# Patient Record
Sex: MALE | URBAN - METROPOLITAN AREA
[De-identification: names, ages, dates, MRNs, and addresses within clinical notes are randomized per-mention and may not be internally consistent; named-entity substitution may affect disease eponyms.]

---

## 2020-09-02 ENCOUNTER — IMPORTED ENCOUNTER (OUTPATIENT)
Dept: URBAN - METROPOLITAN AREA CLINIC 9 | Facility: CLINIC | Age: 70
End: 2020-09-02

## 2020-10-05 ENCOUNTER — IMPORTED ENCOUNTER (OUTPATIENT)
Dept: URBAN - METROPOLITAN AREA CLINIC 9 | Facility: CLINIC | Age: 70
End: 2020-10-05

## 2020-11-04 ENCOUNTER — IMPORTED ENCOUNTER (OUTPATIENT)
Dept: URBAN - METROPOLITAN AREA CLINIC 9 | Facility: CLINIC | Age: 70
End: 2020-11-04

## 2021-03-30 NOTE — PATIENT DISCUSSION
BLEPHARITIS, OU: PRESCRIBE WARM COMPRESSES AND EYELID SCRUBS QD-BID, ARTIFICIAL TEARS BID-QID, AND ERYTHROMYCIN OPHTHALMIC OINTMENT EVERY NIGHT AS NEEDED. RETURN FOR FOLLOW-UP AS SCHEDULED. Products Recommended: Any brand with an SPF of 70 or higher is OK.  [employer] and [employer] (blue butterfly) make oil free sunscreens that are suitable for use as daily moisturizers. General Sunscreen Counseling: I recommended a broad spectrum sunscreen with a SPF of 70 or higher.These should be applied daily year round. I explained that SPF 70 sunscreens block approximately 97 percent of the sun's harmful rays.  Sunscreens should be applied at least 15 minutes prior to expected sun exposure and then every 2 hours after that as long as sun exposure continues. If swimming or exercising sunscreen should be reapplied every 45 minutes to an hour after getting wet or sweating.  One ounce, or the equivalent of a shot glass full of sunscreen, is adequate to protect the skin not covered by a bathing suit. I also recommended a lip balm with a sunscreen as well. Sun protective clothing can be used  but must be worn the entire time you are exposed to the sun's rays. Detail Level: Detailed

## 2021-05-05 ENCOUNTER — IMPORTED ENCOUNTER (OUTPATIENT)
Dept: URBAN - METROPOLITAN AREA CLINIC 9 | Facility: CLINIC | Age: 71
End: 2021-05-05

## 2021-06-08 NOTE — PATIENT DISCUSSION
Tap and culture done today and advised can take several days for results, Recommend intavitreal injection of antibiotics today. R & B discussed in detail. Pt elects to proceed. Injections given without complication. Also recommend starting Moxi 1gtt QH while awake and start oral Cipro 500 mg BID PO-RTC x 1 day.  Discontinue contact lens use.

## 2021-06-08 NOTE — PROCEDURE NOTE: CLINICAL
PROCEDURE NOTE: A/C Paracentesis(tap and culture) #1 OD. Diagnosis: Endophthalmitis. Anesthesia: Proparacaine 0.5%. Prior to treatment, the risks/benefits/alternatives were discussed. The patient wished to proceed with procedure. Location of Tap: Temporal Limbus. The eye was prepped with topical Betadine 5%, a sterile lid speculum was placed in the eye. Volume of tap: 0.1 ml. Patient tolerated procedure well. There were no complications. Post procedure instructions given. Patient given office phone number/answering service number and advised to call immediately should there be an increase in floaters or redness, loss of vision or pain, or should they have any other questions or concerns. Tap and culture sent to [facility]. PROCEDURE NOTE: Intravitreal Ceftazidime #1 OD. Diagnosis: Endophthalmitis. Anesthesia: Pledget. Prep: Betadine Drops and Betadine Scrub. Prior to injection, risks/benefits/alternatives discussed including infection, loss of vision, hemorrhage, cataract, retinal tears or detachment and patient wished to proceed. Betadine prep was performed. Intravitreal Injection of Ceftazidime (2.25/0.1 ml) was given. Injection site: 3-4 mm from the limbus. Patient tolerated the procedure well. There were no complications. Central retinal artery was perfused after injection. Post procedure instructions given. Lot #: [id number]. Expiration Date: .EXP. Evan Antunez

## 2021-06-08 NOTE — PROCEDURE NOTE: CLINICAL
PROCEDURE NOTE: A/C Paracentesis(tap and culture) #1 OD. Diagnosis: Endophthalmitis. Anesthesia: Proparacaine 0.5%. Prior to treatment, the risks/benefits/alternatives were discussed. The patient wished to proceed with procedure. Location of Tap: Temporal Limbus. The eye was prepped with topical Betadine 5%, a sterile lid speculum was placed in the eye. Volume of tap: 0.1 ml. Patient tolerated procedure well. There were no complications. Post procedure instructions given. Patient given office phone number/answering service number and advised to call immediately should there be an increase in floaters or redness, loss of vision or pain, or should they have any other questions or concerns. Tap and culture sent to [facility]. PROCEDURE NOTE: Intravitreal Ceftazidime #1 OD. Diagnosis: Endophthalmitis. Anesthesia: Pledget. Prep: Betadine Drops and Betadine Scrub. Prior to injection, risks/benefits/alternatives discussed including infection, loss of vision, hemorrhage, cataract, retinal tears or detachment and patient wished to proceed. Betadine prep was performed. Intravitreal Injection of Ceftazidime (2.25/0.1 ml) was given. Injection site: 3-4 mm from the limbus. Patient tolerated the procedure well. There were no complications. Central retinal artery was perfused after injection. Post procedure instructions given. Lot #: [id number]. Expiration Date: .EXP. Evan Antunez

## 2021-06-09 NOTE — PATIENT DISCUSSION
Improving, rec start Lotemax BID(samples dispensed), start Atropine BID(samples dispensed) and cont. with Moxi 1gtt QH while awake and Oral Cipro as taking.  Still waiting on culture results, this will be an extended healing process.  Start Blephadex lid scrubs BID(samples given).

## 2021-06-11 NOTE — PATIENT DISCUSSION
06/11/2021 Improving. Continue current treatment. Waiting for culture results. F/U with [facility] as sched. Pt told to call if any changes 24/7. Advised pt not to patch the eye in any way.

## 2021-06-15 NOTE — PATIENT DISCUSSION
Pt stated her throat has been sore due to oral Ciprofloxacin. Pt has completed prescription.  Advised pt to use Brown listerine and [employer].

## 2021-06-15 NOTE — PATIENT DISCUSSION
Improving.  Waiting on culture results.  Decrease Moxifloxacin to TID.  Pt finished took last dose of Ciprofloxacin this am., ok to D/C.  D/C Lotemax and Atropine.  See #1.

## 2021-07-02 NOTE — PATIENT DISCUSSION
Stressed to patient unknown visual potential at this time. 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 See #1.

## 2021-07-02 NOTE — PATIENT DISCUSSION
Pain states no pain. Will continue to monitor. Advised pt to call with any increase in pain or pressure.

## 2021-07-02 NOTE — PATIENT DISCUSSION
Corneal ulcer healed and corneal edema is improving. If improved enough at next exam will send for cornea and cataract consult to maximize full visual potential. Recommend decreasing Moxi and Pred to QID. Will follow up in 2 weeks. 24/7 ER reviewed.

## 2021-07-13 NOTE — PATIENT DISCUSSION
Corneal ulcer healed and corneal edema is improving. Will refer pt to Cornea/Cat after eye has remained quiet for at least 3 months. Recommend stopping Moxi and Taper Pred to 3/2/1. Will follow up in 1 month. Advised pt to call with any vision changes.

## 2021-08-17 NOTE — PATIENT DISCUSSION
resolved, cornea has healed.  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  Pt can have refraction if wishes but pt has limited visual potential OD.  Pt clear for refraction, return to retina prn.

## 2021-10-16 ASSESSMENT — VISUAL ACUITY
OD_SC: 20/25 -2 SN
OS_CC: 20/25 SN
OS_SC: 20/30 SN
OD_SC: 20/25 - SN
OD_CC: 20/25 - SN
OS_SC: 20/25 - SN
OD_SC: 20/30 SN
OS_SC: 20/25 SN
OD_CC: 20/25 SN
OS_SC: 20/25 + SN
OS_CC: 20/30 + SN

## 2021-10-16 ASSESSMENT — KERATOMETRY
OS_K1POWER_DIOPTERS: 41.75
OS_AXISANGLE2_DEGREES: 62
OD_K2POWER_DIOPTERS: 43
OD_K1POWER_DIOPTERS: 42.5
OD_AXISANGLE2_DEGREES: 60
OS_AXISANGLE_DEGREES: 152
OS_K2POWER_DIOPTERS: 41
OD_AXISANGLE_DEGREES: 150

## 2021-10-16 ASSESSMENT — TONOMETRY
OD_IOP_MMHG: 19
OS_IOP_MMHG: 15
OS_IOP_MMHG: 18
OS_IOP_MMHG: 14
OS_IOP_MMHG: 15
OD_IOP_MMHG: 16
OD_IOP_MMHG: 17

## 2022-03-29 NOTE — PATIENT DISCUSSION
Previous: resolved, cornea has healed.  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  Pt can have refraction if wishes but pt has limited visual potential OD.  Pt clear for refraction, return to retina prn.

## 2022-06-21 ENCOUNTER — ESTABLISHED PATIENT (OUTPATIENT)
Dept: URBAN - METROPOLITAN AREA CLINIC 17 | Facility: CLINIC | Age: 72
End: 2022-06-21

## 2022-06-21 DIAGNOSIS — H35.372: ICD-10-CM

## 2022-06-21 DIAGNOSIS — H04.123: ICD-10-CM

## 2022-06-21 PROCEDURE — 92014 COMPRE OPH EXAM EST PT 1/>: CPT

## 2022-06-21 PROCEDURE — 92134 CPTRZ OPH DX IMG PST SGM RTA: CPT

## 2022-06-21 ASSESSMENT — VISUAL ACUITY
OU_SC: 20/25-2
OS_SC: 20/25-2
OD_SC: 20/25-2

## 2022-06-21 ASSESSMENT — TONOMETRY
OS_IOP_MMHG: 16
OD_IOP_MMHG: 15

## 2022-06-28 RX ORDER — AMLODIPINE BESYLATE 5 MG/1
TABLET ORAL
COMMUNITY

## 2022-06-28 RX ORDER — ALBUTEROL SULFATE 90 UG/1
AEROSOL, METERED RESPIRATORY (INHALATION)
COMMUNITY

## 2022-07-20 NOTE — PATIENT DISCUSSION
PRESCRIBE AREDS 2 VITAMINS AND UV PROTECTION TO SLOW PROGRESSION. SMOKING CESSATION EMPHASIZED. PRESCRIBE REGULAR AMSLER GRID CHECKS TO SELF MONITOR. PATIENT TO CALL WITH CHANGES FOR RETINA CONSULT. RETURN FOR FOLLOW-UP AS SCHEDULED.

## 2023-06-21 ENCOUNTER — ESTABLISHED PATIENT (OUTPATIENT)
Dept: URBAN - METROPOLITAN AREA CLINIC 17 | Facility: CLINIC | Age: 73
End: 2023-06-21

## 2023-06-21 DIAGNOSIS — H04.123: ICD-10-CM

## 2023-06-21 DIAGNOSIS — H35.372: ICD-10-CM

## 2023-06-21 PROCEDURE — 92134 CPTRZ OPH DX IMG PST SGM RTA: CPT

## 2023-06-21 PROCEDURE — 92014 COMPRE OPH EXAM EST PT 1/>: CPT

## 2023-06-21 ASSESSMENT — TONOMETRY
OD_IOP_MMHG: 14
OS_IOP_MMHG: 14

## 2023-06-21 ASSESSMENT — VISUAL ACUITY
OS_SC: 20/25-2
OD_SC: 20/25-2

## 2024-06-24 ENCOUNTER — ESTABLISHED PATIENT (OUTPATIENT)
Dept: URBAN - METROPOLITAN AREA CLINIC 17 | Facility: CLINIC | Age: 74
End: 2024-06-24

## 2024-06-24 DIAGNOSIS — H16.223: ICD-10-CM

## 2024-06-24 DIAGNOSIS — H35.372: ICD-10-CM

## 2024-06-24 PROCEDURE — 99214 OFFICE O/P EST MOD 30 MIN: CPT

## 2024-06-24 PROCEDURE — 92134 CPTRZ OPH DX IMG PST SGM RTA: CPT

## 2024-06-24 ASSESSMENT — TONOMETRY
OS_IOP_MMHG: 12
OD_IOP_MMHG: 12

## 2024-06-24 ASSESSMENT — VISUAL ACUITY
OS_SC: 20/30-1
OD_SC: 20/25-1

## 2024-09-04 ENCOUNTER — EMERGENCY VISIT (OUTPATIENT)
Dept: URBAN - METROPOLITAN AREA CLINIC 17 | Facility: CLINIC | Age: 74
End: 2024-09-04

## 2024-09-04 DIAGNOSIS — H35.372: ICD-10-CM

## 2024-09-04 DIAGNOSIS — H43.812: ICD-10-CM

## 2024-09-04 PROCEDURE — 99214 OFFICE O/P EST MOD 30 MIN: CPT

## 2024-09-04 PROCEDURE — 92134 CPTRZ OPH DX IMG PST SGM RTA: CPT

## 2025-07-16 ENCOUNTER — COMPREHENSIVE EXAM (OUTPATIENT)
Age: 75
End: 2025-07-16

## 2025-07-16 DIAGNOSIS — H43.812: ICD-10-CM

## 2025-07-16 DIAGNOSIS — H35.372: ICD-10-CM

## 2025-07-16 PROCEDURE — 92014 COMPRE OPH EXAM EST PT 1/>: CPT

## 2025-07-16 PROCEDURE — 92134 CPTRZ OPH DX IMG PST SGM RTA: CPT

## 2025-07-16 PROCEDURE — 92015 DETERMINE REFRACTIVE STATE: CPT
